# Patient Record
Sex: FEMALE | Race: OTHER | HISPANIC OR LATINO | URBAN - METROPOLITAN AREA
[De-identification: names, ages, dates, MRNs, and addresses within clinical notes are randomized per-mention and may not be internally consistent; named-entity substitution may affect disease eponyms.]

---

## 2019-10-15 ENCOUNTER — EMERGENCY (EMERGENCY)
Facility: HOSPITAL | Age: 29
LOS: 1 days | Discharge: HOME | End: 2019-10-15
Attending: EMERGENCY MEDICINE | Admitting: EMERGENCY MEDICINE
Payer: SELF-PAY

## 2019-10-15 VITALS — OXYGEN SATURATION: 97 % | HEART RATE: 91 BPM | RESPIRATION RATE: 16 BRPM | TEMPERATURE: 98 F

## 2019-10-15 PROCEDURE — 99284 EMERGENCY DEPT VISIT MOD MDM: CPT

## 2019-10-15 PROCEDURE — 99053 MED SERV 10PM-8AM 24 HR FAC: CPT

## 2019-10-15 RX ADMIN — Medication 2 MILLIGRAM(S): at 23:35

## 2019-10-15 RX ADMIN — HALOPERIDOL DECANOATE 5 MILLIGRAM(S): 100 INJECTION INTRAMUSCULAR at 23:35

## 2019-10-15 NOTE — ED ADULT NURSE NOTE - CHIEF COMPLAINT QUOTE
BIBA picked up from [facility] [employer] [facility] for alcohol intoxication. Pt admits to alcohol use. +AOB, pt very uncooperative on arrival. +slurred speech and kept on repeating words. No signs of trauma.

## 2019-10-15 NOTE — ED ADULT NURSE NOTE - NSIMPLEMENTINTERV_GEN_ALL_ED
Implemented All Fall Risk Interventions:  [place] to call system. Call bell, personal items and telephone within reach. Instruct patient to call for assistance. Room bathroom lighting operational. Non-slip footwear when patient is off stretcher. Physically safe environment: no spills, clutter or unnecessary equipment. Stretcher in lowest position, wheels locked, appropriate side rails in place. Provide visual cue, wrist band, yellow gown, etc. Monitor gait and stability. Monitor for mental status changes and reorient to person, place, and time. Review medications for side effects contributing to fall risk. Reinforce activity limits and safety measures with patient and family.

## 2019-10-15 NOTE — ED ADULT NURSE NOTE - CHPI ED NUR SYMPTOMS NEG
no blurred vision/no fever/no dizziness/no nausea/no confusion/no vomiting/no loss of consciousness/no numbness/no weakness/no change in level of consciousness

## 2019-10-15 NOTE — ED ADULT NURSE NOTE - OBJECTIVE STATEMENT
The patient is a 29y Female complaining of altered mental status. Admits to ETOH use, no obvious signs of trauma. Uncooperative with hx, yelling profanities towards staff. Security present at bedside for monitoring. The patient is a 29y Female complaining of altered mental status. Admits to ETOH use, no obvious signs of trauma. Uncooperative with hx, yelling profanities towards staff. Security present outside of room for safety.

## 2019-10-15 NOTE — ED ADULT TRIAGE NOTE - CHIEF COMPLAINT QUOTE
BIBA picked up from [facility] [employer] [facility] for alcohol intoxication. Pt admits to alcohol use. +AOB, pt very uncooperative on arrival. +slurred speech and kept on repeating words. No signs of trauma.

## 2019-10-16 VITALS
SYSTOLIC BLOOD PRESSURE: 103 MMHG | DIASTOLIC BLOOD PRESSURE: 64 MMHG | HEART RATE: 107 BPM | RESPIRATION RATE: 18 BRPM | OXYGEN SATURATION: 98 %

## 2019-10-16 PROCEDURE — 70450 CT HEAD/BRAIN W/O DYE: CPT | Mod: 26

## 2019-10-16 RX ORDER — HALOPERIDOL DECANOATE 100 MG/ML
5 INJECTION INTRAMUSCULAR ONCE
Refills: 0 | Status: COMPLETED | OUTPATIENT
Start: 2019-10-16 | End: 2019-10-15

## 2019-10-16 NOTE — ED PROVIDER NOTE - NEUROLOGICAL LEVEL OF CONSCIOUSNESS
CONFUSED I personally performed the service described in the documentation recorded by the scribe in my presence, and it accurately and completely records my words and actions.

## 2019-10-16 NOTE — ED ADULT NURSE REASSESSMENT NOTE - NS ED NURSE REASSESS COMMENT FT1
received pt from [facility]- pt sleepy in stretcher, placed near nurses station for close observation. Rpt VS stable. no injuries noted.
Patient had +fall by bedside, was found by staff RN facedown on floor. Assisted to bed and VS taken. Patient is awake, speaking to staff, no obvious signs of bleeding, swelling, laceration. States "I don't have any pain". MD Blancas made aware, CT ordered and expedited, will continue to monitor.

## 2019-10-16 NOTE — ED PROVIDER NOTE - CLINICAL SUMMARY MEDICAL DECISION MAKING FREE TEXT BOX
patient arrives clinically intoxicated, with aob, combative and verbally abusive with staff, given chemical sedation as she presents with ams and fall risk. no head trauma on arrival. continue to monitor for sobriety.

## 2019-10-16 NOTE — ED ADULT NURSE REASSESSMENT NOTE - INTERVENTIONS DEFINITIONS
Provide visual cue, wrist band, yellow gown, etc./Physically safe environment: no spills, clutter or unnecessary equipment/Monitor gait and stability/Reinforce activity limits and safety measures with patient and family/Monitor for mental status changes and reorient to person, place, and time/Stretcher in lowest position, wheels locked, appropriate side rails in place

## 2019-10-16 NOTE — ED ADULT NURSE REASSESSMENT NOTE - NSIMPLEMENTINTERV_GEN_ALL_ED
Specific interventions were implemented:
Implemented All Fall Risk Interventions:  [place] to call system. Call bell, personal items and telephone within reach. Instruct patient to call for assistance. Room bathroom lighting operational. Non-slip footwear when patient is off stretcher. Physically safe environment: no spills, clutter or unnecessary equipment. Stretcher in lowest position, wheels locked, appropriate side rails in place. Provide visual cue, wrist band, yellow gown, etc. Monitor gait and stability. Monitor for mental status changes and reorient to person, place, and time. Review medications for side effects contributing to fall risk. Reinforce activity limits and safety measures with patient and family.

## 2019-10-16 NOTE — ED PROVIDER NOTE - PATIENT PORTAL LINK FT
You can access the FollowMyHealth Patient Portal offered by [facility] by registering at the following website: [email or web address]. By joining [employer]’s FollowMyHealth portal, you will also be able to view your health information using other applications (apps) compatible with our system.

## 2019-10-16 NOTE — ED PROVIDER NOTE - UNABLE TO OBTAIN
patient arrives combative, intoxicated, unable to provide a history or cooperate with physical exam Urgent need for Intervention

## 2019-10-20 DIAGNOSIS — F10.129 ALCOHOL ABUSE WITH INTOXICATION, UNSPECIFIED: ICD-10-CM

## 2019-10-20 DIAGNOSIS — R41.82 ALTERED MENTAL STATUS, UNSPECIFIED: ICD-10-CM

## 2023-05-21 NOTE — ED PROVIDER NOTE - PSYCHIATRIC SPEECH
LOUD The patient is a 10y Female complaining of  Picato Counseling:  I discussed with the patient the risks of Picato including but not limited to erythema, scaling, itching, weeping, crusting, and pain.

## 2024-12-12 NOTE — ED PROVIDER NOTE - OBJECTIVE STATEMENT
Paul patient arrives combative, intoxicated, unable to provide a history or cooperate with physical exam

## 2025-04-29 NOTE — ED ADULT NURSE NOTE - BREATHING, MLM
Subjective     Chief Complaint   Patient presents with    Pharyngitis     Sore throat onset last week now with facial pressure with non productive cough         Pharyngitis  Associated symptoms: congestion and sore throat    Associated symptoms: no cough, no diarrhea, no fever, no nausea, no shortness of breath and no vomiting     65-year-old female presents for laryngitis sore throat facial pressure congestion going on for approximately 3 to 4 days.  No fever chills nausea vomiting.  No specific treatment.  She does take Zyrtec for allergies.    Past Medical History:   Diagnosis Date    Asthmatic bronchitis 12/06/2012    Avulsion fracture of ankle 07/10/2015    Howard's cyst 12/09/2013    Left knee; 12/6/13, Dr Olguin    Dash esophagus with esophagitis 02/05/2025    EGD 4/2024 Grade B Reflux Esophagitis; EGD 8/2024: Hiatal Hernia, Gastric Polyps, Barretts Esophagus, Dr. Almeida       Cervical spinal stenosis 07/14/2016 2016 Dr Byrne, PT    Chronic depression 12/06/2012    Chronic left-sided low back pain without sciatica 02/15/2025    Xrays 2/2025: Multilevel spondylosis      Closed fracture of left ankle 08/26/2019    6-1-19, boot, no surgery    DDD (degenerative disc disease), cervical 07/14/2016    Hiatal hernia with GERD and esophagitis 02/05/2025    EGD 4/2024 Grade B Esophagitis; EGD 8/2024: Hiatal Hernia, Gastric Polyps, Barretts Esophagus, Dr. Almeida       History of endometrial cancer 04/01/2020    Grade 1 Endometrioid Carcinoma, S/P Total Lap Hysterectomy-BSO 9-2015; Gyn Dr Busch, Gyn Onc, Dr Chai Pinto      Mild intermittent asthma without complication 05/09/2019    2-3 x a year triggered by peak allergy time or URI's    Mixed hyperlipidemia 8/26/2019    Moderate persistent asthma without complication 03/21/2020    3-2020    OA (osteoarthritis) of knee 12/06/2012    Perennial allergic rhinitis with seasonal variation 05/09/2019    Peaks spring and fall    Pneumonia 12/06/2012    Postmenopause  Spontaneous, unlabored and symmetrical